# Patient Record
Sex: FEMALE | Race: WHITE
[De-identification: names, ages, dates, MRNs, and addresses within clinical notes are randomized per-mention and may not be internally consistent; named-entity substitution may affect disease eponyms.]

---

## 2018-10-07 ENCOUNTER — HOSPITAL ENCOUNTER (EMERGENCY)
Dept: HOSPITAL 59 - ER | Age: 18
Discharge: HOME | End: 2018-10-07
Payer: COMMERCIAL

## 2018-10-07 DIAGNOSIS — L23.7: Primary | ICD-10-CM

## 2018-10-07 DIAGNOSIS — F17.210: ICD-10-CM

## 2018-10-07 PROCEDURE — 99283 EMERGENCY DEPT VISIT LOW MDM: CPT

## 2018-10-07 PROCEDURE — 96372 THER/PROPH/DIAG INJ SC/IM: CPT

## 2018-10-07 NOTE — EMERGENCY DEPARTMENT RECORD
History of Present Illness





- General


Chief complaint: Rash


Stated complaint: RASH


Time Seen by Provider: 10/07/18 19:53


Source: Patient


Mode of Arrival: Ambulatory


Limitations: No limitations





- History of Present Illness


Initial comments: 





pt has been chopping trees and broke out in a rash a week ago that itches. she 

felt that there must have been poison ivy in the trees


MD complaint: Rash


Onset/Timin


-: Week(s)


Patient Tetanus UTD (within 5 yrs): Yes


Location: RUE, LLE


Severity: Mild


Improves with: None


Worsens with: None


Context: Other


Associated symptoms: Itching


Treatments Prior to Arrival: Benadryl, OTC topical medication





- Related Data


 Home Medications











 Medication  Instructions  Recorded  Confirmed  Last Taken


 


Etonogestrel [Nexplanon] 68 mg SQ ASDIR 10/07/18 10/07/18 Unknown








 Previous Rx's











 Medication  Instructions  Recorded


 


Methylprednisolone [Medrol Dose 4 mg PO ASDIR #1 tab.ds.pk 10/07/18





Pack]  











 Allergies











Allergy/AdvReac Type Severity Reaction Status Date / Time


 


No Known Drug Allergies Allergy   Verified 16 23:54














Travel Screening





- Travel/Exposure Within Last 30 Days


Have you traveled within the last 30 days?: No





Review of Systems


Reviewed: No additional complaints except as noted below


Constitutional: Reports: As per HPI.  Denies: Chills, Fever, Malaise, Night 

sweats, Weakness, Weight change


Eyes: Reports: As per HPI.  Denies: Eye discharge, Eye pain, Photophobia, 

Vision change


ENT: Reports: As per HPI.  Denies: Congestion, Dental pain, Ear pain, Epistaxis

, Hearing loss, Throat pain


Respiratory: Reports: As per HPI.  Denies: Cough, Dyspnea, Hemoptysis, Stridor, 

Wheezes


Cardiovascular: Reports: As per HPI.  Denies: Arrhythmia, Chest pain, Dyspnea 

on exertion, Edema, Murmurs, Orthopnea, Palpitations, Paroxysmal nocturnal 

dyspnea, Rheumatic Fever, Syncope


Endocrine: Reports: As per HPI.  Denies: Fatigue, Heat or cold intolerance, 

Polydipsia, Polyuria


Gastrointestinal: Reports: As per HPI.  Denies: Abdominal pain, Constipation, 

Diarrhea, Hematemesis, Hematochezia, Melena, Nausea, Vomiting


Genitourinary: Reports: As per HPI.  Denies: Abnormal menses, Discharge, 

Dyspareunia, Dysuria, Frequency, Hematuria, Incontinence, Retention, Urgency


Musculoskeletal: Reports: As per HPI.  Denies: Arthralgia, Back pain, Gout, 

Joint swelling, Myalgia, Neck pain


Skin: Reports: As per HPI, Rash.  Denies: Bruising, Change in color, Change in 

hair/nails, Lesions, Pruritus


Neurological: Reports: As per HPI.  Denies: Abnormal gait, Confusion, Headache, 

Numbness, Paresthesias, Seizure, Tingling, Tremors, Vertigo, Weakness


Psychiatric: Reports: As per HPI.  Denies: Anxiety, Auditory hallucinations, 

Depression, Homicidal thoughts, Suicidal thoughts, Visual hallucinations


Hematological/Lymphatic: Reports: As per HPI.  Denies: Anemia, Blood Clots, 

Easy bleeding, Easy bruising, Swollen glands





Past Medical History





- SOCIAL HISTORY


Smoking Status: Current every day smoker


Alcohol Use: None


Drug Use: None





- RESPIRATORY


Hx Respiratory Disorders: No





- CARDIOVASCULAR


Hx Cardio Disorders: No





- NEURO


Hx Neuro Disorders: No





- GI


Hx GI Disorders: No





- 


Hx Genitourinary Disorders: No





- ENDOCRINE


Hx Endocrine Disorders: No





- MUSCULOSKELETAL


Hx Musculoskeletal Disorders: Yes


Comment:: adithya FERMIN





- PSYCH


Hx Psych Problems: No





- HEMATOLOGY/ONCOLOGY


Hx Hematology/Oncology Disorders: No





Family Medical History


Any Significant Family History?: Yes


Hx Cancer: Grandparents


Hx Heart Disease: Grandparents


Hx HTN: Grandparents





Physical Exam





- General


General Appearance: Alert, Oriented x3, Cooperative, Mild distress





- Head


Head exam: Normal inspection





- Eye


Eye exam: Normal appearance, PERRL, EOMI


Pupils: Normal accommodation





- ENT


ENT exam: Normal exam, Mucous membranes moist, Normal external ear exam, Normal 

orophraynx


Ear exam: Normal external inspection.  negative: External canal tenderness


Nasal Exam: Normal inspection.  negative: Discharge, Sinus tenderness


Mouth exam: Normal external inspection, Tongue normal


Teeth exam: Normal inspection.  negative: Dental caries


Throat exam: Normal inspection.  negative: Tonsillar erythema, Tonsillar exudate





- Neck


Neck exam: Normal inspection, Full ROM.  negative: Tenderness





- Respiratory


Respiratory exam: Normal lung sounds bilaterally.  negative: Respiratory 

distress





- Cardiovascular


Cardiovascular Exam: Regular rate, Normal rhythm, Normal heart sounds





- GI/Abdominal


GI/Abdominal exam: Soft, Normal bowel sounds.  negative: Tenderness





- Rectal


Rectal exam: Deferred





- 


 exam: Deferred





- Extremities


Extremities exam: Normal inspection, Full ROM, Normal capillary refill.  

negative: Tenderness





- Back


Back exam: Reports: Normal inspection, Full ROM.  Denies: Muscle spasm, Rash 

noted, Tenderness





- Neurological


Neurological exam: Alert, CN II-XII intact, Normal gait, Oriented X3





- Psychiatric


Psychiatric exam: Normal affect, Normal mood





- Skin


Skin exam: Dry, Intact, Normal color, Warm


Type of lesion: Rash


Distribution of rash: RUE, LUE


Description of rash: Macular, Papular, Swelling, Vesicular





Course





 Vital Signs











  10/07/18





  19:47


 


Temperature 98.6 F


 


Pulse Rate [ 103 H





Pulse Ox Probe] 


 


Respiratory 20





Rate 


 


Blood Pressure 133/104





[Left Arm] 


 


Pulse Ox 97














Disposition


Disposition: Discharge


Clinical Impression: 


 Poison ivy dermatitis





Disposition: Home, Self-Care


Condition: (1) Good


Instructions:  Poison Ivy (ED), Cold Compress or Soak (ED)


Additional Instructions: 


follow up with family doctor.  return sooner if worse.  take benadryl every 6 

hours.


Prescriptions: 


Methylprednisolone [Medrol Dose Pack] 4 mg PO ASDIR #1 tab.ds.pk





Quality





- Quality Measures


Quality Measures: N/A





- Blood Pressure Screening


Does Patient Have Any of the Following: No


Blood Pressure Classification: Hypertensive Reading


Systolic Measurement: 133


Diastolic Measurement: 104


Screening for High Blood Pressure: < Pre-Hypertensive BP, F/U Documented > [

]


Pre-Hypertensive Follow-up Interventions: Follow-up with rescreen every year.